# Patient Record
(demographics unavailable — no encounter records)

---

## 2025-05-15 NOTE — HISTORY OF PRESENT ILLNESS
[5] : 5 [Dull/Aching] : dull/aching [Intermittent] : intermittent [] : no [FreeTextEntry5] : 78 y/o M presents for a NP LT knee pain . states to have pain for a month. gets pain when walking .

## 2025-05-15 NOTE — ASSESSMENT
[FreeTextEntry1] : The patient is a 76 yo man presenting with left knee pain of one month without trauma. He woke up with pain. The patient reports his pain worsened for the next week and had slight swelling to his ankle. The patient has stiffness with going from sitting to standing. He has pain with sitting for too long. He has pain with walking distances and has had less pain with stairs than 4 weeks ago. He denies paresthesias. He had not used oral AIs at all. He has no night pain.  Examination of the left lower extremity reveals normal neurovascular exam.  Examination of the left knee reveals slight limitation range of motion from 5 to 125 degrees with a trace effusion.  There is mild medial joint line pain with no Debbie's sign or instability.  There is normal patella tracking with no apprehension.  There is no redness, rashes or visible scars.  X-rays done in the office today of the left knee 4 views weightbearing show moderate medial joint space narrowing but not bone-on-bone.  There is no deformity.  There are no obvious tumors, mass or calcifications seen.  The plan at this time is to observe for now as he is getting better.  Will consider a cortisone injection if he is not better in a month.  Will see him back in the office as necessary.